# Patient Record
Sex: FEMALE | Race: WHITE | HISPANIC OR LATINO | Employment: UNEMPLOYED | URBAN - METROPOLITAN AREA
[De-identification: names, ages, dates, MRNs, and addresses within clinical notes are randomized per-mention and may not be internally consistent; named-entity substitution may affect disease eponyms.]

---

## 2024-09-03 ENCOUNTER — HOSPITAL ENCOUNTER (EMERGENCY)
Facility: CLINIC | Age: 29
Discharge: HOME | End: 2024-09-03

## 2024-09-03 ENCOUNTER — APPOINTMENT (OUTPATIENT)
Dept: RADIOLOGY | Facility: CLINIC | Age: 29
End: 2024-09-03
Attending: EMERGENCY MEDICINE

## 2024-09-03 VITALS
DIASTOLIC BLOOD PRESSURE: 61 MMHG | OXYGEN SATURATION: 99 % | SYSTOLIC BLOOD PRESSURE: 138 MMHG | HEART RATE: 88 BPM | RESPIRATION RATE: 16 BRPM | WEIGHT: 217 LBS | TEMPERATURE: 98.2 F

## 2024-09-03 DIAGNOSIS — S93.401A SPRAIN OF RIGHT ANKLE, UNSPECIFIED LIGAMENT, INITIAL ENCOUNTER: ICD-10-CM

## 2024-09-03 PROCEDURE — 99284 EMERGENCY DEPT VISIT MOD MDM: CPT

## 2024-09-03 PROCEDURE — 250N000013 HC RX MED GY IP 250 OP 250 PS 637

## 2024-09-03 PROCEDURE — 73610 X-RAY EXAM OF ANKLE: CPT | Mod: RT

## 2024-09-03 RX ORDER — NAPROXEN 250 MG/1
500 TABLET ORAL ONCE
Status: COMPLETED | OUTPATIENT
Start: 2024-09-03 | End: 2024-09-03

## 2024-09-03 RX ORDER — NAPROXEN 500 MG/1
TABLET ORAL
Qty: 10 TABLET | Refills: 0 | Status: SHIPPED | OUTPATIENT
Start: 2024-09-03

## 2024-09-03 RX ADMIN — NAPROXEN 500 MG: 250 TABLET ORAL at 17:54

## 2024-09-03 ASSESSMENT — COLUMBIA-SUICIDE SEVERITY RATING SCALE - C-SSRS
6. HAVE YOU EVER DONE ANYTHING, STARTED TO DO ANYTHING, OR PREPARED TO DO ANYTHING TO END YOUR LIFE?: NO
1. IN THE PAST MONTH, HAVE YOU WISHED YOU WERE DEAD OR WISHED YOU COULD GO TO SLEEP AND NOT WAKE UP?: NO
2. HAVE YOU ACTUALLY HAD ANY THOUGHTS OF KILLING YOURSELF IN THE PAST MONTH?: NO

## 2024-09-03 ASSESSMENT — ACTIVITIES OF DAILY LIVING (ADL): ADLS_ACUITY_SCORE: 35

## 2024-09-03 NOTE — ED PROVIDER NOTES
Emergency Department Encounter  [facility] EMERGENCY ROOM  Clara Mitchell   AGE: 29 year old SEX: female  YOB: 1995  MRN: [id number]      EVALUATION DATE & TIME: No admission date for patient encounter. ; PCP: No Ref-Primary, Physician   ED PROVIDER: Jelly Mora PA-C    CHIEF COMPLAINT: Fall and Ankle Pain (Right/)      MEDICAL DECISION MAKING   Clara Mitchell is an otherwise healthy 29 year old female who presents to the ED for evaluation of right ankle pain that onset after patient tripped on a rug yesterday evening and inverted her right foot.  Patient denies any other injuries or head trauma.    Vitals reviewed and hemodynamically stable, afebrile. Mild swelling noted to the lateral aspect of the ankle.  No warmth or overlying skin changes.  Tenderness to palpation of the anterior talofibular ligament and calcaneofibular ligament as well as deltoid ligament. Patient able to plantarflex and dorsiflex with diminished strength given pain.     Presentation most consistent with right ankle sprain.  XR imaging of right ankle independently interpreted and without evidence of fracture or dislocation.  I considered, but doubt based on history and evaluation today, bony fracture, dislocation, syndesmosis injury, septic joint, DVT, compartment syndrome, gastrocnemius strain, achilles tendon rupture, peripheral artery disease, gout/pseudogout, and cellulitis.    Patient is otherwise well appearing and without evidence of neurovascular injury or compartment syndrome, plan to discharge home with symptomatic care including rest, ice, elevation and OTC analgesics.  Recommended 5-day course of naproxen and place patient in cam boot with crutches.  Patient was provided with clear, written instructions of potential danger signs and where and when to return for emergency care or re-evaluation. [facility] contact information provided for  follow-up in 5 days if symptoms are not improving.    Medical Decision Making  Obtained supplemental history:Supplemental history obtained?: Documented in chart and Family Member/Significant Other  Reviewed external records: External records reviewed?: No  Care impacted by chronic illness:N/A  Care significantly affected by social determinants of health:Literacy  Did you consider but not order tests?: Work up considered but not performed and documented in chart, if applicable  Did you interpret images independently?: Independent interpretation of ECG and images noted in documentation, when applicable.  Consultation discussion with other provider:Did you involve another provider (consultant, , pharmacy, etc.)?: No  Discharge. I prescribed additional prescription strength medication(s) as charted. See documentation for any additional details.      FINAL IMPRESSION       ICD-10-CM    1. Sprain of right ankle, unspecified ligament, initial encounter  S93.401A Ankle/Foot Bracing Supplies Order Walking Boot; Right; Non-pneumatic; Short (tall is OK)          ED COURSE   4:53 PM I met and introduced myself to the patient. I gathered initial history and performed my physical exam.  Initial physical exam completed in upright chair in temporary exam room due to boarding crisis in ED while patient awaits for room with assigned RN and vital monitoring. We discussed results, discharge, follow up, and reasons to return to the ED.     MEDICATIONS GIVEN IN THE EMERGENCY DEPARTMENT:   Medications   naproxen (NAPROSYN) tablet 500 mg (has no administration in time range)      NEW PRESCRIPTIONS STARTED AT TODAY'S ED VISIT:  New Prescriptions    NAPROXEN (NAPROSYN) 500 MG TABLET    Take 1 tablet by mouth 2 times daily (with meals) for 5 days for pain and inflammation       =================================================================     HISTORY OF PRESENT ILLNESS   Patient information was obtained from: patient, significant  other  Use of Intrepreter: yes, phone (Nepali)    Clara Mitchell is an otherwise healthy 29 year old female who presents to the ED by private vehicle with significant other for evaluation of fall and ankle pain.  Patient reports that yesterday evening she tripped on a rug and rolled her right foot.  She reports an inversion injury and states she was not able to bear weight at all after.  No other injuries or head trauma.  No daily medications or blood thinners.  No medications taken prior to arrival.  No previous surgical history on involved ankle.      MEDICAL HISTORY     History reviewed. No pertinent past medical history.    No past surgical history on file.    No family history on file.           There is no immunization history on file for this patient.     naproxen (NAPROSYN) 500 MG tablet        PHYSICAL EXAM   VITALS:  Patient Vitals for the past 24 hrs:   BP Temp Temp src Pulse Resp SpO2 Weight   09/03/24 1735 -- 98.2  F (36.8  C) Oral -- -- -- --   09/03/24 1532 -- -- -- -- -- -- 98.4 kg (217 lb)   09/03/24 1528 138/61 (!) 96.5  F (35.8  C) -- 88 16 99 % --     There is no height or weight on file to calculate BMI.     Vitals reviewed. Nursing notes reviewed.  CONSITUTIONAL: Generally well-appearing female , in no acute distress. Well developed, nourished.  EYES: Conjunctivae clear, no discharge. EOM grossly intact.  HENT: Normocephalic, atraumatic, mucous membranes moist, nose normal.  NECK: Supple, gross ROM intact.   RESPIRATORY: Normal work of breathing, normal rate, speaks in full sentences.  CARDIO: Normal heart rate.  GI: Nondistended.   MSK:   Right lower extremity: Mild swelling to the lateral aspect of the ankle.  No warmth or overlying skin changes.  Tenderness to palpation of the anterior talofibular ligament and calcaneofibular ligament as well as deltoid ligament. No instability of ankle joint.  Able to plantarflex and dorsiflex with diminished strength given pain. Sensation  grossly intact to light touch. +2 distal pulses with adequate capillary refill.  No pain along Achilles tendon or deformity.  MSK exam is otherwise unremarkable  NEURO:  AxOx4. CN II-XII grossly intact, but not individually tested. No focal neurological deficits.   PSYCH: Thoughts linear and responses appropriate. Cooperative. Appropriate mood and affect.     LABS AND IMAGING   All pertinent labs reviewed and interpreted  Labs Ordered and Resulted from Time of ED Arrival to Time of ED Departure - No data to display    XR Ankle Right G/E 3 Views   Final Result   IMPRESSION: Normal joint spaces and alignment. No fracture.        Jelly Mora PA-C   Emergency Medicine   [facility] EMERGENCY ROOM  [address] [id number] [phone]  Dept: [phone]      Jelly Mora PA-C  09/03/24 0279

## 2024-09-03 NOTE — Clinical Note
Clara Lowe Mitchell was seen and treated in our emergency department on 9/3/2024.  She may return to work on 09/07/2024.       If you have any questions or concerns, please don't hesitate to call.      Jelly Mora PA-C

## 2024-09-03 NOTE — DISCHARGE INSTRUCTIONS
Take the prescribed medication as directed. Do not take additional NSAIDs (ibuprofen, aleve) while taking the naproxen.  Follow RICE for the first 48 hours:  Rest and protect the injured or sore area.  Ice or a cold pack used as soon as possible.  Compression, or wrapping the injured or sore area with an elastic bandage.  Elevation (propping up) the injured or sore area.  Wear the immobilization brace to add stability to your injured joint/extremity. This brace is for comfort and does not need to be worn while bathing or sleeping unless you prefer.   Use crutches when walking until you are able to walk pain free without them.  Start ankle stretched in 2-3 days.  If symptoms are not improving within 5 days with the above interventions, follow up with [facility].      We are always happy to help you out here at [facility] EMERGENCY ROOM. Take care.

## 2024-09-03 NOTE — ED TRIAGE NOTES
Pt presents with ankle injury after falling. Pt reports 8/10 pain in R ankle. Pt reports she is unable to bear weight on R ankle. ABCs intact.      Triage Assessment (Adult)       Row Name 09/03/24 1529          Triage Assessment    Airway WDL WDL        Respiratory WDL    Respiratory WDL WDL        Skin Circulation/Temperature WDL    Skin Circulation/Temperature WDL WDL        Cardiac WDL    Cardiac WDL WDL        Peripheral/Neurovascular WDL    Peripheral Neurovascular WDL WDL        Cognitive/Neuro/Behavioral WDL    Cognitive/Neuro/Behavioral WDL WDL